# Patient Record
Sex: MALE | Race: WHITE | NOT HISPANIC OR LATINO | URBAN - METROPOLITAN AREA
[De-identification: names, ages, dates, MRNs, and addresses within clinical notes are randomized per-mention and may not be internally consistent; named-entity substitution may affect disease eponyms.]

---

## 2017-05-04 ENCOUNTER — EMERGENCY (EMERGENCY)
Facility: HOSPITAL | Age: 32
LOS: 0 days | Discharge: HOME | End: 2017-05-04
Attending: EMERGENCY MEDICINE
Payer: OTHER MISCELLANEOUS

## 2017-05-04 VITALS
TEMPERATURE: 98 F | RESPIRATION RATE: 16 BRPM | OXYGEN SATURATION: 100 % | DIASTOLIC BLOOD PRESSURE: 85 MMHG | HEART RATE: 68 BPM | SYSTOLIC BLOOD PRESSURE: 125 MMHG | HEIGHT: 77 IN | WEIGHT: 240.08 LBS

## 2017-05-04 PROCEDURE — 99284 EMERGENCY DEPT VISIT MOD MDM: CPT

## 2017-05-04 PROCEDURE — 73030 X-RAY EXAM OF SHOULDER: CPT | Mod: 26,LT

## 2017-05-04 PROCEDURE — 73610 X-RAY EXAM OF ANKLE: CPT | Mod: 26,LT

## 2017-05-04 NOTE — ED PROVIDER NOTE - MEDICAL DECISION MAKING DETAILS
Xrays demonstrate no acute pathology. pt appears well and non-toxic. . VSS. Sx have improved during ED stay. No acute events during ED observation period. Precautions given to return to the ED if sx persist or change. Pt expresses understanding and has no further questions. Pt feels comfortable and wishes to be discharged home. Instructed to follow up with PMD ortho in 24 hrs.

## 2017-05-04 NOTE — ED ADULT NURSE NOTE - OBJECTIVE STATEMENT
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

## 2017-05-04 NOTE — ED PROVIDER NOTE - CARE PLAN
Principal Discharge DX:	Sprain of left ankle, unspecified ligament, initial encounter  Secondary Diagnosis:	Sprain of other part of left shoulder region, initial encounter

## 2017-05-04 NOTE — ED PROVIDER NOTE - PHYSICAL EXAMINATION
GEN: Alert, NAD  HEAD: atraumatic, EOMI, PERRL, normal lids/conjunctiva  ENT: normal hearing, patent oropharynx without erythema/exudate, uvula midline  NECK: +supple, no tenderness/meningismus, +Trachea midline  PULM: Bilateral BS, normal resp effort, no wheeze/stridor/retractions  CV: RRR, no M/R/G, +dist ext pulses  ABD: soft, NT/ND  BACK: no CVAT, no midline tenderness  MSK: no edema/erythema/cyanosis  SKIN: no rash  NEURO: AAOx3, no sensory/motor deficits, CN 2-12 intact  L ankle - mild ttp and swelling lateral ankle,   L shoulder - nonfocal pain anterior shoulder, no gross deformity, FROM
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
## 2017-05-05 DIAGNOSIS — S93.402A SPRAIN OF UNSPECIFIED LIGAMENT OF LEFT ANKLE, INITIAL ENCOUNTER: ICD-10-CM

## 2017-05-05 DIAGNOSIS — Z88.0 ALLERGY STATUS TO PENICILLIN: ICD-10-CM

## 2017-05-05 DIAGNOSIS — M25.579 PAIN IN UNSPECIFIED ANKLE AND JOINTS OF UNSPECIFIED FOOT: ICD-10-CM
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
## 2017-05-05 DIAGNOSIS — S43.402A UNSPECIFIED SPRAIN OF LEFT SHOULDER JOINT, INITIAL ENCOUNTER: ICD-10-CM

## 2018-03-29 ENCOUNTER — TRANSCRIPTION ENCOUNTER (OUTPATIENT)
Age: 33
End: 2018-03-29

## 2018-03-30 ENCOUNTER — TRANSCRIPTION ENCOUNTER (OUTPATIENT)
Age: 33
End: 2018-03-30

## 2018-07-25 NOTE — ED PROVIDER NOTE - PRESENTING SYMPTOMS DETAILS
Chief complaint:   Chief Complaint   Patient presents with   • Knee Pain     fall DOI 07/24/18   • Ankle Pain     fall DOI 07/24/18       Vitals:  Visit Vitals  /77 (BP Location: [facility], Patient Position: Sitting, Cuff Size: Large Adult)   Pulse 60   Temp 98.1 °F (36.7 °C) (Oral)   Resp 16   SpO2 95%       HISTORY OF PRESENT ILLNESS     Patient states he was walking up the stairs, fell forwards and landed on right knee and twisted left ankle.  Denies any other injury at incident.  History of left ankle fracture with plate repair.   Chronic peripheral edema.        Fall   The accident occurred 1 to 3 hours ago. The point of impact was the right knee. The pain is present in the right knee (left ankle). The symptoms are aggravated by ambulation and pressure on injury. Pertinent negatives include no fever, loss of consciousness, numbness or tingling. He has tried nothing for the symptoms. The treatment provided no relief.       Other significant problems:  Patient Active Problem List    Diagnosis Date Noted   • Rotator cuff tendinitis 04/24/2018     Priority: Low   • Benign nodular prostatic hyperplasia without lower urinary tract symptoms 12/06/2016     Priority: Low   • Closed fracture of distal end of left fibula 04/04/2016     Priority: Low   • BPH (benign prostatic hyperplasia) 12/01/2014     Priority: Low   •  cardiac [id number] 11/06/2014     Priority: Low   • SHAREE (obstructive sleep apnea) 10/08/2014     Priority: Low     1/28/2014: Unable to tolerate variety of masks - untreated.     • Impotence of organic origin 07/23/2012     Priority: Low   • Microscopic hematuria 07/23/2012     Priority: Low   • Brachioradial pruritus 11/28/2011     Priority: Low   • Hyperlipidemia      Priority: Low   • CAD (coronary artery disease)      Priority: Low     8/31/2000 CABG / Dr Burch @ [facility]: LIMA side-to-side to D1, end-to-side to LAD coronary artery and saphenous veins from aorta to the RCA and from aorta to  M3.  2/26/2003 [facility] / Dr Rothman @ [facility] for recurrent CP/abn stress test: Patent grafts. Small high marginal which has not been bypassed that is < a 2 mm vessel with an 80% ostial lesion. LV function normal. Continue medical therapy; if classic angina w/evidence of ischemia in the high lateral wall, consider angioplasty of this relatively sm marginal branch.   9/15/2005 [facility] / Dr Rothman @ [facility] for CP/abn stress tests: Grafts patent. Severe disease in a tiny high marginal which was not approached. Medical therapy recommended. LV function normal  11/14/2011 [facility] / Dr Ibrahim @ [facility]: RT dominant coronary anatomy generalized CAD: long segment of 70-80% mid RCA stenosis. Diffuse 70-80% LT main stenosis. Ostial stenosis 80-90% CX trunk. Chronic total occ of mid LAD coronary artery. Graphs patent. EF 60%. Mild hypokinesis mid anterior wall. Cont medical management.   11/06/14  [facility]   [place]  Dr. Concepcion   Severe CAD /  RCA mid 90% stenosis /  LAD >LIMA  50% stenosis in LAD /  LAD prox TO /  Left Cflx severe /  AO>OM1 graft wide open /  AO>PDA and OM2 graft wide open /  EF 60%     • GERD (gastroesophageal reflux disease)      Priority: Low   • Obesity      Priority: Low   • Pompholyx 08/31/2011     Priority: Low   • Keratolysis exfoliativa 08/31/2011     Priority: Low   • Essential hypertension, benign 06/10/2011     Priority: Low       PAST MEDICAL, FAMILY AND SOCIAL HISTORY     Medications:  Current Outpatient Prescriptions   Medication   • tolterodine (DETROL LA) 4 MG 24 hr capsule   • potassium chloride (KLOR-CON M) 20 MEQ shruthi ER tablet   • traZODone (DESYREL) 50 MG tablet   • esomeprazole (NEXIUM) 40 MG capsule   • Melatonin 5 MG Chew Tab   • acetaminophen (TYLENOL) 500 MG tablet   • gabapentin (NEURONTIN) 300 MG capsule   • metformin (GLUCOPHAGE-XR) 500 MG 24 hr tablet   • metoPROLOL succinate (TOPROL-XL) 50 MG 24 hr tablet   • clotrimazole (LOTRIMIN) 1 % cream   • carbidopa-levodopa (SINEMET CR)  MG per CR tablet    • simvastatin (ZOCOR) 80 MG tablet   • diclofenac (VOLTAREN) 1 % gel   • furosemide (LASIX) 40 MG tablet   • aspirin 81 MG tablet   • Polyethyl Glycol-Propyl Glycol (SYSTANE FREE OP)   • Fluocinonide 0.1 % CREA   • budesonide (ENTOCORT EC) 3 MG 24 hr capsule   • HYDROcodone-acetaminophen (NORCO) 5-325 MG per tablet   • nitroGLYcerin (NITROSTAT) 0.4 MG SL tablet     No current facility-administered medications for this visit.        Allergies:  ALLERGIES:   Allergen Reactions   • Klaron [Sulfacetamide Sodium] RASH     Burning sensation on face with this rosacea cm   • Adhesive ERYTHEMA     EKG patches    • Ambien Other (See Comments)     CONFUSION       Past Medical  History/Surgeries:  Past Medical History:   Diagnosis Date   • Anxiety    • Arthritis    • Bullard's esophagus    • Blood clot associated with vein wall inflammation 2006    behind left eye retina;    • BPH (benign prostatic hyperplasia)    • CAD (coronary artery disease)     CABG 8/31/2000   • Cataract    • Diabetes mellitus (CMS/HCC)    • Exogenous obesity     chronic   • Fracture     left collarbone   • Fracture     left ankle   • GERD (gastroesophageal reflux disease)    • HTN (hypertension)     patient denies   • Hx of CABG 2010   • Hypercholesterolemia    • Parkinson disease (CMS/HCC)     follows at [facility]   • Sleep apnea     does not tolerate c pap       Past Surgical History:   Procedure Laterality Date   • Cardiac surgery     • Coronary artery bypass graft  08/31/2000    Dr Burch @ [facility]: LIMA side-to-side to D1, end-to-side to LAD coronary artery and saphenous veins from aorta to the RCA and from aorta to M3.   • Cystourethroscopy  8/13/12    Dr. Toth-microscopic hematuria   • Cystourethroscopy  9/30/14    Dr. Toth   • Cystourethroscopy  7/27/2015    Dr. Toth   • Cystourethroscopy w inj for chemodenervation of the bladder  10/26/2016    100IU botox   • Eye surgery      lasik   • Gastric fundoplication  2001   • Halo application  2010     FOR [facility]   • Hernia repair  2010    UMBILICAL   • Laser of prostate w/ green light pvp  12/3/14    Dr. Toth   • Left heart cath,percutaneous  11/14/2011    Dr Ibrahim @ [facility]: RT dominant coronary anatomy generalized CAD: long segment of 70-80% mid RCA stenosis. Diffuse 70-80% LT main stenosis. Ostial stenosis 80-90% CX trunk. Chronic total occ of mid LAD coronary artery. Graphs patent. EF 60%. Mild hypokinesis mid anterior wall. Cont medical management.   • Left heart cath,percutaneous  2/26/2003    Dr Rothman @ [facility] for recurrent CP/abn stress test: Patent grafts. Small high marginal which has not been bypassed that is < a 2 mm vessel with an 80% ostial lesion. LV function normal. Continue medical therapy; if classic angina w/evidence of ischemia in the high lateral wall, consider angioplasty of this relatively sm marginal branch.   • Left heart cath,percutaneous  9/15/2005    Dr Rothman @ [facility] for CP/abn stress tests: Grafts patent. Severe disease in a tiny high marginal which was not approached. Medical therapy recommended. LV function normal.   • Left heart cath,percutaneous  11/06/2014    [facility]/ Dr. White/ Severe CAD/  RCA mid 90% stenosis/  LAD distal>LIMA  50% stenosis/  LAD prox TO/  Left Cflx severe/  Both OM's occluded/  AO>OM1 graft wide open/  AO>PDA and OM2 graft wide open/  EF 60%   • Orif tibia & fibula fractures  04/14/2016    Dr. Faith   • Ptca         Family History:  Family History   Problem Relation Age of Onset   • Cancer Sister    • Other Sister         PSEUDOCHOLINESTERASE   • High cholesterol Sister    • Cancer Brother         bladder   • Diabetes Brother    • Heart disease Brother    • High cholesterol Brother    • Aneurysm Mother    • Arthritis Mother    • Liver Disease Father         DUE TO ETOH       Social History:  Social History   Substance Use Topics   • Smoking status: Former Smoker     Packs/day: 1.00     Years: 2.00     Types: Cigarettes     Quit  date: 1/1/1970   • Smokeless tobacco: Never Used   • Alcohol use 0.0 oz/week      Comment: rarely       REVIEW OF SYSTEMS     Review of Systems   Constitutional: Negative for fever.   Neurological: Negative for tingling, loss of consciousness and numbness.       PHYSICAL EXAM     Physical Exam   Constitutional: He is oriented to person, place, and time. He appears well-developed and well-nourished. No distress.   Musculoskeletal:        Right knee: He exhibits normal range of motion, no swelling, no effusion, no ecchymosis and no erythema. Lacerations: abrasion on knee. Tenderness found. Medial joint line tenderness noted. No lateral joint line tenderness noted.        Right ankle: He exhibits normal range of motion, no swelling, no ecchymosis, no deformity and normal pulse. No tenderness. No lateral malleolus, no medial malleolus and no head of 5th metatarsal tenderness found. Achilles tendon normal.        Left ankle: He exhibits normal range of motion, no swelling, no ecchymosis, no deformity, no laceration and normal pulse. Tenderness. Lateral malleolus tenderness found. No medial malleolus tenderness found. Achilles tendon normal.        Right lower leg: He exhibits edema. He exhibits no tenderness and no bony tenderness.        Left lower leg: He exhibits edema. He exhibits no tenderness and no bony tenderness.        Legs:       Right foot: There is normal range of motion, no tenderness, no bony tenderness, no swelling and normal capillary refill.        Left foot: There is normal range of motion, no tenderness and no bony tenderness.   Neurological: He is alert and oriented to person, place, and time.   Skin: Skin is warm and dry. He is not diaphoretic.   Psychiatric: He has a normal mood and affect. His behavior is normal. Judgment and thought content normal.   Nursing note and vitals reviewed.    EXAM:  Three-view right knee series, three-view left ankle series     DATE:  7/24/2018     CLINICAL INDICATION:   Fall with complaints of right knee and left ankle  pain     COMPARISON:  Three-view left ankle series 1/14/2018     FINDINGS:  Surgical clips noted medially within the proximal calf. No acute  fracture or dislocation involves the right knee. No significant  suprapatellar joint effusion is present. Joint spaces appear  well-maintained.      Orthopedic plate and transfixing screws again demonstrated within the  distal left fibula. No acute fracture or dislocation involves the left  ankle. The mortise is maintained.        IMPRESSION:    1.  No acute fracture or dislocation involving the right knee or left ankle     ASSESSMENT/PLAN     Dudley was seen today for knee pain and ankle pain.    Diagnoses and all orders for this visit:    Fall in home, initial encounter  -     XR KNEE 3 VW RIGHT; Future  -     XR ANKLE 3+ VW LEFT; Future    Acute pain of right knee  -     XR KNEE 3 VW RIGHT; Future    Acute left ankle pain  -     XR ANKLE 3+ VW LEFT; Future      Abrasion cleaned and dressed by nursing staff.   Ace wrap to right knee and left ankle.   Care of area reviewed.  Normal course of injury reviewed. Patient to elevate legs and follow up with PCP or partner due to peripheral edema.   Comfort care measures, over the counter treatments, when to follow up and seek emergency care; all reviewed with patient.   After visit summary reviewed with patient.  Tammy L Schladweiler, NP  Collaborating Physician: Toshia Grande MD.     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 no falls. no knee pain, no headache/backpain. no focal weakness/numbness  ROS: No fever/chills, No photophobia/eye pain/changes in vision, No ear pain/sore throat/dysphagia, No chest pain/palpitations, no SOB/cough/wheeze/stridor, No abdominal pain/N/V/D, no dysuria/frequency/discharge, No neck/back pain, no rash, no changes in neurological status/function.

## 2019-01-16 ENCOUNTER — TRANSCRIPTION ENCOUNTER (OUTPATIENT)
Age: 34
End: 2019-01-16

## 2019-07-15 ENCOUNTER — EMERGENCY (EMERGENCY)
Facility: HOSPITAL | Age: 34
LOS: 0 days | Discharge: HOME | End: 2019-07-15
Payer: OTHER MISCELLANEOUS

## 2019-07-15 VITALS
HEART RATE: 67 BPM | RESPIRATION RATE: 20 BRPM | OXYGEN SATURATION: 100 % | TEMPERATURE: 98 F | HEIGHT: 77 IN | WEIGHT: 270.07 LBS | DIASTOLIC BLOOD PRESSURE: 70 MMHG | SYSTOLIC BLOOD PRESSURE: 131 MMHG

## 2019-07-15 VITALS — DIASTOLIC BLOOD PRESSURE: 78 MMHG | TEMPERATURE: 98 F | SYSTOLIC BLOOD PRESSURE: 132 MMHG | HEART RATE: 76 BPM

## 2019-07-15 DIAGNOSIS — M54.5 LOW BACK PAIN: ICD-10-CM
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
## 2019-07-15 PROCEDURE — 99283 EMERGENCY DEPT VISIT LOW MDM: CPT

## 2019-07-15 RX ORDER — CYCLOBENZAPRINE HYDROCHLORIDE 10 MG/1
1 TABLET, FILM COATED ORAL
Qty: 12 | Refills: 0
Start: 2019-07-15

## 2019-07-15 RX ORDER — IBUPROFEN 200 MG
600 TABLET ORAL ONCE
Refills: 0 | Status: COMPLETED | OUTPATIENT
Start: 2019-07-15 | End: 2019-07-15

## 2019-07-15 RX ORDER — IBUPROFEN 200 MG
1 TABLET ORAL
Qty: 20 | Refills: 0
Start: 2019-07-15

## 2019-07-15 RX ADMIN — Medication 600 MILLIGRAM(S): at 15:28

## 2019-07-15 NOTE — ED PROVIDER NOTE - OBJECTIVE STATEMENT
35 y/o male, [employer], with no PMH here c/o lower back pain s/p injury x 1 hour ago. 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 pain worse with movement. pt hasn't taken anything for pain. no bowel or bladder incontinence. no ivda. no fevers. no change in sensation or change in gait. pt otherwise has no other complaints    ROS: No fever/chills. No eye pain/changes in vision, No ear pain/sore throat/dysphagia, No chest pain/palpitations. No SOB/cough/. No abdominal pain, N/V/D, no black/bloody bm. No dysuria/frequency/discharge, No headache. No Dizziness.    No rashes or breaks in skin. No numbness/tingling/weakness.

## 2019-07-15 NOTE — ED PROVIDER NOTE - CLINICAL SUMMARY MEDICAL DECISION MAKING FREE TEXT BOX
likely muscle strain, no red flags, neuro exam unremarkable, pt is ambulatory in ed without complications, vss, afebrile, pt states feels better after meds pt not driving home, pt will fu with pcp, return precautions given, ok with dc

## 2019-07-15 NOTE — ED PROVIDER NOTE - PHYSICAL EXAMINATION
Gen: Alert, NAD, well appearing, not toxic  Head: NC, AT, PERRL, EOMI, normal lids/conjunctiva  ENT: B TM WNL, normal hearing, patent oropharynx without erythema/exudate, uvula midline  Neck: +supple, no tenderness/meningismus/JVD, +Trachea midline  Pulm: Bilateral BS, normal resp effort, no wheeze/stridor/retractions  CV: RRR, no M/R/G, +dist pulses  Abd: soft, NT/ND, +BS, no hepatosplenomegaly  Mskel: no edema/erythema/cyanosis no saddle anesthesia, +R lumbar paraspinal tenderness, no ecchymosis, no step off, no spinal tenderness ctls, neg slr, str 5/5 x4 bl, sensations intact, gait ok  Skin: no rash  Neuro: AAOx3, no sensory/motor deficits, CN 2-12 intact

## 2019-09-19 ENCOUNTER — EMERGENCY (EMERGENCY)
Facility: HOSPITAL | Age: 34
LOS: 1 days | Discharge: HOME | End: 2019-09-19
Attending: EMERGENCY MEDICINE | Admitting: EMERGENCY MEDICINE
Payer: COMMERCIAL

## 2019-09-19 VITALS
HEART RATE: 64 BPM | TEMPERATURE: 98 F | DIASTOLIC BLOOD PRESSURE: 76 MMHG | OXYGEN SATURATION: 98 % | RESPIRATION RATE: 16 BRPM | SYSTOLIC BLOOD PRESSURE: 138 MMHG

## 2019-09-19 VITALS — HEIGHT: 77 IN | WEIGHT: 279.99 LBS

## 2019-09-19 PROCEDURE — 73030 X-RAY EXAM OF SHOULDER: CPT

## 2019-09-19 PROCEDURE — 99283 EMERGENCY DEPT VISIT LOW MDM: CPT

## 2019-09-19 PROCEDURE — 73030 X-RAY EXAM OF SHOULDER: CPT | Mod: 26,RT

## 2019-09-19 RX ORDER — IBUPROFEN 200 MG
600 TABLET ORAL ONCE
Refills: 0 | Status: COMPLETED | OUTPATIENT
Start: 2019-09-19 | End: 2019-09-19

## 2019-09-19 RX ADMIN — Medication 600 MILLIGRAM(S): at 22:38
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
## 2019-09-19 NOTE — ED PROVIDER NOTE - NS ED ROS FT
General: Denies fevers or chills  Eyes: Denies vision changes  ENMT: Denies trouble swallowing or speaking, or sore throat  CV: Denies chest pain  Resp: Denies cough or SOB  GI: Denies abdominal pain, nausea, vomiting, diarrhea, or constipation   : Denies painful urination  Skin: Denies new rashes  Neuro: Denies headache, numbness, or weakness  MSK: R shoulder pain, R lower back pain

## 2019-09-19 NOTE — ED PROVIDER NOTE - PHYSICAL EXAMINATION
I have reviewed the triage vital signs.  Const: AAOx3, laying comfortably in NAD  Eyes: PERRL, no conjunctival injection  HENT: NCAT, Neck supple without meningismus, oral mucosa moist  CV: RRR, no murmurs, rubs, or gallops  Resp: CTAB, no respiratory distress, no wheezes, rales, or rhonchi  GI: Abdomen soft, NTND, no guarding, no CVA tenderness  Extremities: No peripheral edema,  2+ radial and DP pulses  Skin: Warm, well perfused, no rash. No ecchymosis or erythema  MSK: No gross deformities appreciated. Mild TTP R anterior shoulder and R lumbar paraspinals. No midline tenderness   Neuro: CN2-12 grossly intact, MS +5/5 in UE and LE BL, gross sensation intact in UE and LE BL, gait smooth and coordinated  Psych: Appropriate mood and affect
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
## 2019-09-19 NOTE — ED PROVIDER NOTE - NSFOLLOWUPINSTRUCTIONS_ED_ALL_ED_FT
You were seen in the ER for shoulder pain and back pain.    1. Follow up with your primary care doctor within 48 hours.    2. You may take Ibuprofen (Motrin, Advil) or Acetaminophen (Tylenol) as needed, as directed on packaging for pain or fever.    3. Return to the ER for any new or worsening symptoms or concerns, such as fever, chills, worsening pain, numbness, tingling, etc.

## 2019-09-19 NOTE — ED PROVIDER NOTE - PATIENT PORTAL LINK FT
You can access the FollowMyHealth Patient Portal offered by [facility] by registering at the following website: [email or web address]. By joining [employer]’s FollowMyHealth portal, you will also be able to view your health information using other applications (apps) compatible with our system.
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
## 2019-10-13 ENCOUNTER — TRANSCRIPTION ENCOUNTER (OUTPATIENT)
Age: 34
End: 2019-10-13

## 2019-10-31 ENCOUNTER — TRANSCRIPTION ENCOUNTER (OUTPATIENT)
Age: 34
End: 2019-10-31

## 2019-12-26 ENCOUNTER — RX RENEWAL (OUTPATIENT)
Age: 34
End: 2019-12-26

## 2019-12-26 ENCOUNTER — APPOINTMENT (OUTPATIENT)
Dept: INTERNAL MEDICINE | Facility: CLINIC | Age: 34
End: 2019-12-26
Payer: COMMERCIAL

## 2019-12-26 VITALS — DIASTOLIC BLOOD PRESSURE: 80 MMHG | SYSTOLIC BLOOD PRESSURE: 120 MMHG

## 2019-12-26 VITALS
HEART RATE: 92 BPM | OXYGEN SATURATION: 98 % | HEIGHT: 76 IN | BODY MASS INDEX: 34.1 KG/M2 | WEIGHT: 280 LBS | TEMPERATURE: 98 F

## 2019-12-26 DIAGNOSIS — F98.8 OTHER SPECIFIED BEHAVIORAL AND EMOTIONAL DISORDERS WITH ONSET USUALLY OCCURRING IN CHILDHOOD AND ADOLESCENCE: ICD-10-CM

## 2019-12-26 DIAGNOSIS — Z78.9 OTHER SPECIFIED HEALTH STATUS: ICD-10-CM

## 2019-12-26 PROCEDURE — 82272 OCCULT BLD FECES 1-3 TESTS: CPT

## 2019-12-26 PROCEDURE — 36415 COLL VENOUS BLD VENIPUNCTURE: CPT

## 2019-12-26 PROCEDURE — 99385 PREV VISIT NEW AGE 18-39: CPT | Mod: 25

## 2019-12-26 NOTE — HISTORY OF PRESENT ILLNESS
[de-identified] : 34 M good health for CPE. ROS all neg exc for tension type H/A daytime w/o neuro signs or sympt. On no meds but would like to try adderral for ADD.Not sexually active. not smoke. Works [employer]

## 2019-12-26 NOTE — ASSESSMENT
[FreeTextEntry1] : pt would like tx for ADD> To see bloods. H/A likely 2nd to tension.Overall health good . To get immunization record

## 2019-12-26 NOTE — PHYSICAL EXAM
[Well Developed] : well developed [Well Nourished] : well nourished [No Acute Distress] : no acute distress [Well-Appearing] : well-appearing [PERRL] : pupils equal round and reactive to light [Normal Sclera/Conjunctiva] : normal sclera/conjunctiva [EOMI] : extraocular movements intact [Normal Outer Ear/Nose] : the outer ears and nose were normal in appearance [Normal Oropharynx] : the oropharynx was normal [Supple] : supple [No Lymphadenopathy] : no lymphadenopathy [No JVD] : no jugular venous distention [Thyroid Normal, No Nodules] : the thyroid was normal and there were no nodules present [No Accessory Muscle Use] : no accessory muscle use [No Respiratory Distress] : no respiratory distress  [Clear to Auscultation] : lungs were clear to auscultation bilaterally [Normal Rate] : normal rate  [Regular Rhythm] : with a regular rhythm [No Carotid Bruits] : no carotid bruits [Normal S1, S2] : normal S1 and S2 [No Murmur] : no murmur heard [No Varicosities] : no varicosities [No Abdominal Bruit] : a ~M bruit was not heard ~T in the abdomen [No Palpable Aorta] : no palpable aorta [Pedal Pulses Present] : the pedal pulses are present [No Edema] : there was no peripheral edema [Soft] : abdomen soft [No Extremity Clubbing/Cyanosis] : no extremity clubbing/cyanosis [Non Tender] : non-tender [Non-distended] : non-distended [No Masses] : no abdominal mass palpated [No HSM] : no HSM [Normal Bowel Sounds] : normal bowel sounds [Stool Occult Blood] : stool negative for occult blood [Normal Posterior Cervical Nodes] : no posterior cervical lymphadenopathy [Normal Anterior Cervical Nodes] : no anterior cervical lymphadenopathy [No Spinal Tenderness] : no spinal tenderness [No CVA Tenderness] : no CVA  tenderness [No Joint Swelling] : no joint swelling [Grossly Normal Strength/Tone] : grossly normal strength/tone [No Rash] : no rash [Coordination Grossly Intact] : coordination grossly intact [No Focal Deficits] : no focal deficits [Normal Gait] : normal gait [Deep Tendon Reflexes (DTR)] : deep tendon reflexes were 2+ and symmetric [Normal Insight/Judgement] : insight and judgment were intact [Normal Affect] : the affect was normal [de-identified] : large, well muscled [Normal] : affect was normal and insight and judgment were intact [de-identified] : tottoos [FreeTextEntry1] : prostate neg

## 2019-12-27 LAB
ALBUMIN SERPL ELPH-MCNC: 4.9 G/DL
ALP BLD-CCNC: 72 U/L
ALT SERPL-CCNC: 19 U/L
ANION GAP SERPL CALC-SCNC: 11 MMOL/L
APPEARANCE: CLEAR
AST SERPL-CCNC: 18 U/L
BACTERIA: NEGATIVE
BASOPHILS # BLD AUTO: 0.05 K/UL
BASOPHILS NFR BLD AUTO: 0.8 %
BILIRUB SERPL-MCNC: 0.2 MG/DL
BILIRUBIN URINE: NEGATIVE
BLOOD URINE: NEGATIVE
BUN SERPL-MCNC: 20 MG/DL
CALCIUM SERPL-MCNC: 10.1 MG/DL
CHLORIDE SERPL-SCNC: 104 MMOL/L
CHOLEST SERPL-MCNC: 206 MG/DL
CHOLEST/HDLC SERPL: 7.1 RATIO
CO2 SERPL-SCNC: 27 MMOL/L
COLOR: NORMAL
CREAT SERPL-MCNC: 1.16 MG/DL
EOSINOPHIL # BLD AUTO: 0.05 K/UL
EOSINOPHIL NFR BLD AUTO: 0.8 %
GLUCOSE QUALITATIVE U: NEGATIVE
GLUCOSE SERPL-MCNC: 112 MG/DL
HCT VFR BLD CALC: 41.9 %
HDLC SERPL-MCNC: 29 MG/DL
HGB BLD-MCNC: 14 G/DL
HYALINE CASTS: 0 /LPF
IMM GRANULOCYTES NFR BLD AUTO: 0.2 %
KETONES URINE: NEGATIVE
LDLC SERPL CALC-MCNC: 110 MG/DL
LEUKOCYTE ESTERASE URINE: NEGATIVE
LYMPHOCYTES # BLD AUTO: 1.53 K/UL
LYMPHOCYTES NFR BLD AUTO: 24.4 %
MAN DIFF?: NORMAL
MCHC RBC-ENTMCNC: 31.4 PG
MCHC RBC-ENTMCNC: 33.4 GM/DL
MCV RBC AUTO: 93.9 FL
MICROSCOPIC-UA: NORMAL
MONOCYTES # BLD AUTO: 0.34 K/UL
MONOCYTES NFR BLD AUTO: 5.4 %
NEUTROPHILS # BLD AUTO: 4.3 K/UL
NEUTROPHILS NFR BLD AUTO: 68.4 %
NITRITE URINE: NEGATIVE
PH URINE: 6.5
PLATELET # BLD AUTO: 270 K/UL
POTASSIUM SERPL-SCNC: 4.7 MMOL/L
PROT SERPL-MCNC: 7.6 G/DL
PROTEIN URINE: NEGATIVE
RBC # BLD: 4.46 M/UL
RBC # FLD: 11.7 %
RED BLOOD CELLS URINE: 0 /HPF
SODIUM SERPL-SCNC: 142 MMOL/L
SPECIFIC GRAVITY URINE: 1.03
SQUAMOUS EPITHELIAL CELLS: 0 /HPF
T4 SERPL-MCNC: 5.4 UG/DL
TRIGL SERPL-MCNC: 337 MG/DL
TSH SERPL-ACNC: 1.51 UIU/ML
UROBILINOGEN URINE: NORMAL
WBC # FLD AUTO: 6.28 K/UL
WHITE BLOOD CELLS URINE: 0 /HPF

## 2020-01-02 ENCOUNTER — RX RENEWAL (OUTPATIENT)
Age: 35
End: 2020-01-02

## 2020-02-26 PROBLEM — Z78.9 OTHER SPECIFIED HEALTH STATUS: Chronic | Status: ACTIVE | Noted: 2019-09-19

## 2020-04-26 ENCOUNTER — MESSAGE (OUTPATIENT)
Age: 35
End: 2020-04-26

## 2020-05-02 LAB
SARS-COV-2 IGG SERPL IA-ACNC: <0.1 INDEX
SARS-COV-2 IGG SERPL QL IA: NEGATIVE

## 2020-07-20 ENCOUNTER — EMERGENCY (EMERGENCY)
Facility: HOSPITAL | Age: 35
LOS: 1 days | Discharge: HOME | End: 2020-07-20
Attending: EMERGENCY MEDICINE | Admitting: EMERGENCY MEDICINE
Payer: OTHER MISCELLANEOUS

## 2020-07-20 VITALS
OXYGEN SATURATION: 100 % | TEMPERATURE: 98 F | HEART RATE: 99 BPM | DIASTOLIC BLOOD PRESSURE: 79 MMHG | RESPIRATION RATE: 18 BRPM | SYSTOLIC BLOOD PRESSURE: 137 MMHG

## 2020-07-20 PROCEDURE — 73564 X-RAY EXAM KNEE 4 OR MORE: CPT | Mod: 26,LT

## 2020-07-20 PROCEDURE — 99283 EMERGENCY DEPT VISIT LOW MDM: CPT

## 2020-07-20 RX ORDER — IBUPROFEN 200 MG
400 TABLET ORAL ONCE
Refills: 0 | Status: COMPLETED | OUTPATIENT
Start: 2020-07-20 | End: 2020-07-20

## 2020-07-20 RX ADMIN — Medication 400 MILLIGRAM(S): at 20:12

## 2020-07-20 NOTE — ED PROVIDER NOTE - CLINICAL SUMMARY MEDICAL DECISION MAKING FREE TEXT BOX
36 yo M no sig pmh presents with left knee pain after twisting injury.  Exam with non-swollen knee without laxity.  Possible MCL sprain.  Will obtain x-ray, analgesia.  Anticipate discharge.

## 2020-07-20 NOTE — ED PROVIDER NOTE - NSFOLLOWUPINSTRUCTIONS_ED_ALL_ED_FT
1.  Please rest, ice, and elevate knee after discharge.   2.  Take ibuprofen 400 mg every 6-8 hours as needed with food.   3.  Return to care for worsening pain, swelling, fever, numbness, tingling, inability to walk.   4.  Follow with your primary care doctor as early as able.

## 2020-07-20 NOTE — ED PROVIDER NOTE - PHYSICAL EXAMINATION
GEN:  Non-toxic appearing, non-distressed, speaking full sentences, non-diaphoretic, AAOx3  HEENT:  NCAT, neck supple, EOMI, PERRLA, sclera anicteric, no conjunctival pallor or injection, no stridor, normal voice, no tonsillar exudate, uvula midline, tympanic membranes and external auditory canals normal appearing bilaterally   CV:  regular rhythm and rate, s1/s2 audible, no murmurs, rubs or gallops, peripheral pulses 2+ and symmetric  PULM:  non-labored respirations, lungs clear to auscultation bilaterally, no wheezes, crackles or rales  ABD:  non distended, non-tender, no rebound, no guarding, negative Holguin's sign, bowel sounds normal, no cvat  MSK:  left knee without swelling or deformity, minimally tender at medial joint line, stable to a/p/v/v stress   NEURO:  AAOx3, CN II-XII intact, motor 5/5 in upper and lower extremities bilaterally, sensation grossly intact in extremities and trunk, finger to nose testing wnl, no nystagmus, negative Romberg, no pronator drift, no gait deficit  SKIN:  warm, dry, no rash or vesicles

## 2020-07-20 NOTE — ED PROVIDER NOTE - PATIENT PORTAL LINK FT
You can access the FollowMyHealth Patient Portal offered by [facility] by registering at the following website: [email or web address]. By joining [employer]’s FollowMyHealth portal, you will also be able to view your health information using other applications (apps) compatible with our system.

## 2020-07-20 NOTE — ED PROVIDER NOTE - OBJECTIVE STATEMENT
34 yo M no sig pmh presents with left knee pain.  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  Initially did not feel pain, but then noticed left knee pain with ambulation 1-2 hours after injury.  Denies numbness, weakness, tingling.  Still able to ambulate.  Denies other injuries.

## 2020-07-20 NOTE — ED PROVIDER NOTE - PROGRESS NOTE DETAILS
CHAPMAN:  X-ray negative for fracture.  Patient able to ambulate without assistance.  Will discharge.

## 2020-10-16 ENCOUNTER — EMERGENCY (EMERGENCY)
Facility: HOSPITAL | Age: 35
LOS: 1 days | Discharge: HOME | End: 2020-10-16
Attending: EMERGENCY MEDICINE
Payer: COMMERCIAL

## 2020-10-16 VITALS
OXYGEN SATURATION: 99 % | RESPIRATION RATE: 16 BRPM | HEART RATE: 3 BPM | DIASTOLIC BLOOD PRESSURE: 2 MMHG | HEIGHT: 77 IN | WEIGHT: 265 LBS | SYSTOLIC BLOOD PRESSURE: 142 MMHG

## 2020-10-16 VITALS
OXYGEN SATURATION: 99 % | HEART RATE: 79 BPM | SYSTOLIC BLOOD PRESSURE: 117 MMHG | RESPIRATION RATE: 16 BRPM | TEMPERATURE: 99 F | DIASTOLIC BLOOD PRESSURE: 79 MMHG

## 2020-10-16 PROCEDURE — 99283 EMERGENCY DEPT VISIT LOW MDM: CPT

## 2020-10-16 PROCEDURE — 73120 X-RAY EXAM OF HAND: CPT

## 2020-10-16 PROCEDURE — 73120 X-RAY EXAM OF HAND: CPT | Mod: 26,LT

## 2020-10-16 RX ORDER — IBUPROFEN 200 MG
600 TABLET ORAL ONCE
Refills: 0 | Status: COMPLETED | OUTPATIENT
Start: 2020-10-16 | End: 2020-10-16

## 2020-10-16 RX ADMIN — Medication 600 MILLIGRAM(S): at 20:28

## 2020-10-16 NOTE — ED PROVIDER NOTE - CLINICAL SUMMARY MEDICAL DECISION MAKING FREE TEXT BOX
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

## 2020-10-16 NOTE — ED ADULT NURSE NOTE - OBJECTIVE STATEMENT
34yo M, walked in to ED c/o L ring finger pain. 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 Pt is resting in bed, denies Chest pain, SOB, N/V/D, fever. 36yo M, walked in to ED c/o L ring finger pain. 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 Pt states that finger only hurts when he tries to move it 5/10. Upon assessment L ring finger does not appear to have swelling, redness, ecchymosis, lacs, abrasions, limited ROM noted. Pt is resting in bed, denies Chest pain, SOB, N/V/D, fever.

## 2020-10-16 NOTE — ED PROVIDER NOTE - OBJECTIVE STATEMENT
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

## 2020-10-16 NOTE — ED PROVIDER NOTE - NSFOLLOWUPINSTRUCTIONS_ED_ALL_ED_FT
1) Follow-up with your primary care provider in 1-2 days.      Follow-up with plastic/hand surgery in 1 week.  2) Continue to take all medications as prescribed.  3) Rest and drink plenty of fluids. Pain can be managed with Acetaminophen (aka Tylenol) and Ibuprofen (aka Motrin or Advil) over the counter as directed.  4) Return to the ER for any new or worsening symptoms.

## 2020-10-16 NOTE — ED ADULT NURSE NOTE - NSIMPLEMENTINTERV_GEN_ALL_ED
Implemented All Universal Safety Interventions:  [place] to call system. Call bell, personal items and telephone within reach. Instruct patient to call for assistance. Room bathroom lighting operational. Non-slip footwear when patient is off stretcher. Physically safe environment: no spills, clutter or unnecessary equipment. Stretcher in lowest position, wheels locked, appropriate side rails in place.

## 2020-10-16 NOTE — ED PROVIDER NOTE - CARE PROVIDER_API CALL
Finesse Mohan)  Plastic Surgery; Surgery  [address], [address]  [place] [address]  Phone: [phone]  Fax: [phone]  Follow Up Time:

## 2020-10-16 NOTE — ED PROVIDER NOTE - PATIENT PORTAL LINK FT
You can access the FollowMyHealth Patient Portal offered by [facility] by registering at the following website: [email or web address]. By joining [employer]’s FollowMyHealth portal, you will also be able to view your health information using other applications (apps) compatible with our system.

## 2020-11-30 ENCOUNTER — TRANSCRIPTION ENCOUNTER (OUTPATIENT)
Age: 35
End: 2020-11-30

## 2021-02-26 ENCOUNTER — TRANSCRIPTION ENCOUNTER (OUTPATIENT)
Age: 36
End: 2021-02-26

## 2021-03-18 ENCOUNTER — APPOINTMENT (OUTPATIENT)
Dept: INTERNAL MEDICINE | Facility: CLINIC | Age: 36
End: 2021-03-18
Payer: COMMERCIAL

## 2021-03-18 ENCOUNTER — NON-APPOINTMENT (OUTPATIENT)
Age: 36
End: 2021-03-18

## 2021-03-18 VITALS
WEIGHT: 272 LBS | OXYGEN SATURATION: 98 % | HEIGHT: 76 IN | BODY MASS INDEX: 33.12 KG/M2 | HEART RATE: 83 BPM | TEMPERATURE: 98.1 F

## 2021-03-18 DIAGNOSIS — R51.9 HEADACHE, UNSPECIFIED: ICD-10-CM

## 2021-03-18 DIAGNOSIS — Z23 ENCOUNTER FOR IMMUNIZATION: ICD-10-CM

## 2021-03-18 PROBLEM — Z00.00 ENCOUNTER FOR PREVENTIVE HEALTH EXAMINATION: Status: ACTIVE | Noted: 2021-03-18

## 2021-03-18 PROCEDURE — 93000 ELECTROCARDIOGRAM COMPLETE: CPT

## 2021-03-18 PROCEDURE — 36415 COLL VENOUS BLD VENIPUNCTURE: CPT

## 2021-03-18 PROCEDURE — 99395 PREV VISIT EST AGE 18-39: CPT | Mod: 25

## 2021-03-18 PROCEDURE — 99072 ADDL SUPL MATRL&STAF TM PHE: CPT

## 2021-03-18 RX ORDER — DEXTROAMPHETAMINE SACCHARATE, AMPHETAMINE ASPARTATE, DEXTROAMPHETAMINE SULFATE AND AMPHETAMINE SULFATE 2.5; 2.5; 2.5; 2.5 MG/1; MG/1; MG/1; MG/1
10 TABLET ORAL DAILY
Qty: 30 | Refills: 0 | Status: DISCONTINUED | COMMUNITY
Start: 2020-01-02 | End: 2021-03-18

## 2021-03-18 NOTE — HISTORY OF PRESENT ILLNESS
[de-identified] : The patient is a 36-year-old single male who comes in for complete physical examination.  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  Since she is change in schedule has occurred in January he has experienced headaches which are dull and aching in the frontal sinus region and as well as the left posterior neck.  They do not radiate and is somewhat relieved by Tylenol and relieved by rest and sleep.  They are not associated with nausea vomiting or eye symptoms and he denies focalizing neurologic signs or symptoms.  Prior to this time he never had headaches that would like this.  With this exception he is feeling well and denies chest pain palpitations swelling fainting or dyspnea.  He has no GI symptoms and his weight is stable.  He denies change in his bowel blood in his bowel or melena he has no  symptoms and no history of STDs.  He is not smoking or using drugs and has had a flu shot.  Other than the headaches he is generally feeling well

## 2021-03-18 NOTE — ASSESSMENT
[FreeTextEntry1] : All screening tests done including blood cardiogram and EKG which is normal.  He has headaches which appear to be contraction type but I will have him see neurology as they are interfering with his work.  They are likely related to his work schedule and change in sleeping habits.  I have filled out forms for his work detailing his occasional disability from the headaches

## 2021-03-18 NOTE — PHYSICAL EXAM
[Normal Male:] : meatus normal, the bladder was normal on palpation, the scrotum was normal, there were no testicular masses and no prostate nodules. [Normal] : normal gait, coordination grossly intact, no focal deficits [de-identified] : And I will seen in the discs are sharp but there are no changes in his vessels [de-identified] : Large tonsils but no dysphagia [de-identified] : Of motion of neck normal without causing headaches [de-identified] : No evidence of temporal arteritis [FreeTextEntry1] : Back is negative no masses prostate is normal [de-identified] : No adenopathy [de-identified] : Thyroid is not palpable [de-identified] : Tattoos on the right arm

## 2021-03-19 ENCOUNTER — APPOINTMENT (OUTPATIENT)
Dept: NEUROLOGY | Facility: CLINIC | Age: 36
End: 2021-03-19
Payer: COMMERCIAL

## 2021-03-19 ENCOUNTER — OUTPATIENT (OUTPATIENT)
Dept: OUTPATIENT SERVICES | Facility: HOSPITAL | Age: 36
LOS: 1 days | End: 2021-03-19
Payer: COMMERCIAL

## 2021-03-19 ENCOUNTER — RESULT REVIEW (OUTPATIENT)
Age: 36
End: 2021-03-19

## 2021-03-19 ENCOUNTER — APPOINTMENT (OUTPATIENT)
Dept: MRI IMAGING | Facility: CLINIC | Age: 36
End: 2021-03-19

## 2021-03-19 VITALS
BODY MASS INDEX: 33.12 KG/M2 | SYSTOLIC BLOOD PRESSURE: 125 MMHG | WEIGHT: 272 LBS | DIASTOLIC BLOOD PRESSURE: 74 MMHG | HEIGHT: 76 IN | HEART RATE: 72 BPM

## 2021-03-19 DIAGNOSIS — Z78.9 OTHER SPECIFIED HEALTH STATUS: ICD-10-CM

## 2021-03-19 DIAGNOSIS — Z83.3 FAMILY HISTORY OF DIABETES MELLITUS: ICD-10-CM

## 2021-03-19 DIAGNOSIS — G44.59 OTHER COMPLICATED HEADACHE SYNDROME: ICD-10-CM

## 2021-03-19 LAB
ALBUMIN SERPL ELPH-MCNC: 4.3 G/DL
ALP BLD-CCNC: 64 U/L
ALT SERPL-CCNC: 15 U/L
ANION GAP SERPL CALC-SCNC: 9 MMOL/L
APPEARANCE: CLEAR
AST SERPL-CCNC: 17 U/L
BACTERIA: NEGATIVE
BASOPHILS # BLD AUTO: 0.05 K/UL
BASOPHILS NFR BLD AUTO: 1 %
BILIRUB SERPL-MCNC: 0.6 MG/DL
BILIRUBIN URINE: NEGATIVE
BLOOD URINE: NEGATIVE
BUN SERPL-MCNC: 15 MG/DL
CALCIUM SERPL-MCNC: 9.6 MG/DL
CHLORIDE SERPL-SCNC: 103 MMOL/L
CHOLEST SERPL-MCNC: 201 MG/DL
CO2 SERPL-SCNC: 28 MMOL/L
COLOR: YELLOW
CREAT SERPL-MCNC: 1.01 MG/DL
EOSINOPHIL # BLD AUTO: 0.11 K/UL
EOSINOPHIL NFR BLD AUTO: 2.1 %
ERYTHROCYTE [SEDIMENTATION RATE] IN BLOOD BY WESTERGREN METHOD: 9 MM/HR
GLUCOSE QUALITATIVE U: NEGATIVE
GLUCOSE SERPL-MCNC: 105 MG/DL
HCT VFR BLD CALC: 41.4 %
HDLC SERPL-MCNC: 33 MG/DL
HGB BLD-MCNC: 13.6 G/DL
HYALINE CASTS: 0 /LPF
IMM GRANULOCYTES NFR BLD AUTO: 0.2 %
KETONES URINE: NEGATIVE
LDLC SERPL CALC-MCNC: 144 MG/DL
LEUKOCYTE ESTERASE URINE: NEGATIVE
LYMPHOCYTES # BLD AUTO: 2.02 K/UL
LYMPHOCYTES NFR BLD AUTO: 38.5 %
MAN DIFF?: NORMAL
MCHC RBC-ENTMCNC: 31.1 PG
MCHC RBC-ENTMCNC: 32.9 GM/DL
MCV RBC AUTO: 94.7 FL
MICROSCOPIC-UA: NORMAL
MONOCYTES # BLD AUTO: 0.38 K/UL
MONOCYTES NFR BLD AUTO: 7.3 %
NEUTROPHILS # BLD AUTO: 2.67 K/UL
NEUTROPHILS NFR BLD AUTO: 50.9 %
NITRITE URINE: NEGATIVE
NONHDLC SERPL-MCNC: 168 MG/DL
PH URINE: 6
PLATELET # BLD AUTO: 240 K/UL
POTASSIUM SERPL-SCNC: 4.6 MMOL/L
PROT SERPL-MCNC: 7 G/DL
PROTEIN URINE: NORMAL
RBC # BLD: 4.37 M/UL
RBC # FLD: 11.9 %
RED BLOOD CELLS URINE: 3 /HPF
SODIUM SERPL-SCNC: 141 MMOL/L
SPECIFIC GRAVITY URINE: 1.03
SQUAMOUS EPITHELIAL CELLS: 0 /HPF
T4 SERPL-MCNC: 5.3 UG/DL
TRIGL SERPL-MCNC: 119 MG/DL
TSH SERPL-ACNC: 1.78 UIU/ML
UROBILINOGEN URINE: NORMAL
WBC # FLD AUTO: 5.24 K/UL
WHITE BLOOD CELLS URINE: 1 /HPF

## 2021-03-19 PROCEDURE — 70551 MRI BRAIN STEM W/O DYE: CPT | Mod: 26

## 2021-03-19 PROCEDURE — 99072 ADDL SUPL MATRL&STAF TM PHE: CPT

## 2021-03-19 PROCEDURE — 99205 OFFICE O/P NEW HI 60 MIN: CPT

## 2021-03-19 PROCEDURE — 70544 MR ANGIOGRAPHY HEAD W/O DYE: CPT | Mod: 26,59

## 2021-03-19 RX ORDER — NAPROXEN SODIUM 220 MG
TABLET ORAL
Refills: 0 | Status: ACTIVE | COMMUNITY

## 2021-03-19 NOTE — REVIEW OF SYSTEMS
[Fever] : no fever [Chills] : no chills [Feeling Tired] : not feeling tired [Sleep Disturbances] : sleep disturbances [As Noted in HPI] : as noted in HPI [Dizziness] : no dizziness [Lightheadedness] : no lightheadedness [Tension Headache] : tension-type headaches [Negative] : Heme/Lymph

## 2021-03-19 NOTE — END OF VISIT
[facility]  Gastroenterology  H&P    Patient Name: Topher Cr  MRN: [id number]  Admission Date: 2/15/2019  Code Status: No Order    Attending Provider: Boris Carey MD   Primary Care Physician: Navin Mcfadden MD  Principal Problem:<principal problem not specified>    Subjective:     History of Present Illness: Diarrhea      Past Medical History:   Diagnosis Date    Allergy     Blood clotting tendency     DJD (degenerative joint disease)     Hyperlipidemia     Morbid obesity with BMI of 40.0-44.9, adult 3/29/2018    Obesity     Urticaria        Past Surgical History:   Procedure Laterality Date    COLONOSCOPY N/A 8/6/2015    Performed by Ariel Baez MD at [facility] (4TH FLR)    KNEE SURGERY Right        Review of patient's allergies indicates:   Allergen Reactions    Cucumber fruit extract Hives and Itching     Family History     Problem Relation (Age of Onset)    Asthma Mother, Brother    Heart disease Father        Tobacco Use    Smoking status: Never Smoker    Smokeless tobacco: Never Used   Substance and Sexual Activity    Alcohol use: No     Alcohol/week: 0.0 oz    Drug use: No    Sexual activity: Not on file     Review of Systems   Constitutional: Negative for appetite change and unexpected weight change.   Respiratory: Negative for apnea and chest tightness.    Cardiovascular: Negative for chest pain and palpitations.   Gastrointestinal: Positive for diarrhea. Negative for abdominal distention.     Objective:     Vital Signs (Most Recent):    Vital Signs (24h Range):           There is no height or weight on file to calculate BMI.    No intake or output data in the 24 hours ending 02/15/19 0841    Lines/Drains/Airways          None          Physical Exam   Constitutional: He is oriented to person, place, and time. He appears well-developed and well-nourished. No distress.   HENT:   Head: Normocephalic.   Eyes: Conjunctivae are normal. No scleral icterus.   Neck: No  tracheal deviation present. No thyromegaly present.   Cardiovascular: Normal rate, regular rhythm and normal heart sounds. Exam reveals no gallop and no friction rub.   No murmur heard.  Pulmonary/Chest: Effort normal and breath sounds normal. He has no wheezes. He has no rales.   Abdominal: Soft. Bowel sounds are normal. He exhibits no distension. There is no tenderness. There is no rebound and no guarding.   Musculoskeletal: Normal range of motion. He exhibits no edema or tenderness.   Neurological: He is alert and oriented to person, place, and time.   Skin: He is not diaphoretic.   Psychiatric: He has a normal mood and affect. His behavior is normal.           Assessment/Plan:   - Diarrhea    Plan  1. EGD/Colonoscopy    Boris Carey MD  Gastroenterology  [facility]   [Time Spent: ___ minutes] : I have spent [unfilled] minutes of time on the encounter. [>50% of the face to face encounter time was spent on counseling and/or coordination of care for ___] : Greater than 50% of the face to face encounter time was spent on counseling and/or coordination of care for [unfilled]

## 2021-03-19 NOTE — DISCUSSION/SUMMARY
[FreeTextEntry1] : 1. Patient with new onset headache since early January involving the frontal and temple regions occurring more frequently and intensely \par \par 2. Will obtain MRI and MA of the brain to assess for any structural abnormality \par \par 3. Keep headache diary and may use NSAIDS for abortive and will start for prevention CoQ 10 100mg daily and magnesium oxide 400mg daily \par \par 4. Follow up in 6-8 weeks and all questions answered.

## 2021-03-19 NOTE — PHYSICAL EXAM
[General Appearance - Alert] : alert [Oriented To Time, Place, And Person] : oriented to person, place, and time [Person] : oriented to person [Place] : oriented to place [Time] : oriented to time [Cranial Nerves Optic (II)] : visual acuity intact bilaterally,  visual fields full to confrontation, pupils equal round and reactive to light [Cranial Nerves Oculomotor (III)] : extraocular motion intact [Cranial Nerves Trigeminal (V)] : facial sensation intact symmetrically [Cranial Nerves Facial (VII)] : face symmetrical [Cranial Nerves Vestibulocochlear (VIII)] : hearing was intact bilaterally [Cranial Nerves Glossopharyngeal (IX)] : tongue and palate midline [Cranial Nerves Accessory (XI - Cranial And Spinal)] : head turning and shoulder shrug symmetric [Cranial Nerves Hypoglossal (XII)] : there was no tongue deviation with protrusion [Motor Strength] : muscle strength was normal in all four extremities [Involuntary Movements] : no involuntary movements were seen [No Muscle Atrophy] : normal bulk in all four extremities [Motor Handedness Right-Handed] : the patient is right hand dominant [Paresis Pronator Drift Right-Sided] : no pronator drift on the right [Paresis Pronator Drift Left-Sided] : no pronator drift on the left [Sensation Tactile Decrease] : light touch was intact [Sensation Pain / Temperature Decrease] : pain and temperature was intact [Romberg's Sign] : Romberg's sign was negtive [Dysesthesia] : no dysesthesia [Hyperesthesia] : no hyperesthesia [Limited Balance] : balance was intact [Past-pointing] : there was no past-pointing [Tremor] : no tremor present [Coordination - Dysmetria Impaired Finger-to-Nose Bilateral] : not present [3+] : Patella left 3+ [2+] : Ankle jerk left 2+ [Plantar Reflex Right Only] : normal on the right [Plantar Reflex Left Only] : normal on the left [FreeTextEntry5] : no nystagmus  [Extraocular Movements] : extraocular movements were intact [Neck Appearance] : the appearance of the neck was normal [] : no rash [Skin Lesions] : no skin lesions

## 2021-03-19 NOTE — HISTORY OF PRESENT ILLNESS
[FreeTextEntry1] : Patient reports that since January he has been experiencing more intense headaches. They are located on the frontal region and temple region and are throbbing and pressure like. NO nausea, vomiting, no visual changes or light or sound sensitivity. He denies dizziness with the headaches and no tearing or redness or other autonomic.\par They would every other day or so and can last 1-2 days and this has been happening over the past 2 months. \par He would have to rest and take 1-2 days off and use NSAIDS. 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 \par \par He denies any recent trauma or falls.

## 2021-03-23 ENCOUNTER — NON-APPOINTMENT (OUTPATIENT)
Age: 36
End: 2021-03-23

## 2021-05-18 ENCOUNTER — APPOINTMENT (OUTPATIENT)
Dept: NEUROLOGY | Facility: CLINIC | Age: 36
End: 2021-05-18

## 2021-05-19 ENCOUNTER — APPOINTMENT (OUTPATIENT)
Dept: NEUROLOGY | Facility: CLINIC | Age: 36
End: 2021-05-19
Payer: COMMERCIAL

## 2021-05-19 VITALS
SYSTOLIC BLOOD PRESSURE: 124 MMHG | DIASTOLIC BLOOD PRESSURE: 76 MMHG | HEART RATE: 66 BPM | WEIGHT: 270 LBS | HEIGHT: 76 IN | BODY MASS INDEX: 32.88 KG/M2

## 2021-05-19 DIAGNOSIS — G44.59 OTHER COMPLICATED HEADACHE SYNDROME: ICD-10-CM

## 2021-05-19 PROCEDURE — 99072 ADDL SUPL MATRL&STAF TM PHE: CPT

## 2021-05-19 PROCEDURE — 99214 OFFICE O/P EST MOD 30 MIN: CPT

## 2021-05-19 RX ORDER — ONDANSETRON 4 MG/1
4 TABLET ORAL
Qty: 90 | Refills: 1 | Status: ACTIVE | COMMUNITY
Start: 2021-05-19 | End: 1900-01-01

## 2021-05-19 NOTE — REVIEW OF SYSTEMS
[Fever] : no fever [Chills] : no chills [Sleep Disturbances] : no sleep disturbances [As Noted in HPI] : as noted in HPI [Abnormal Sensation] : no abnormal sensation [Dizziness] : no dizziness [Lightheadedness] : no lightheadedness [Migraine Headache] : no migraine headache [Tension Headache] : no tension-type headache [Negative] : Heme/Lymph

## 2021-05-19 NOTE — DISCUSSION/SUMMARY
[FreeTextEntry1] : 1, Likely migraine headaches : doing well and he will continue magnesium 400mg daily and for abortive aleve and zofran as needed \par \par 2. Keep headache diary . stay well hydrated and avoid potential triggers \par \par 3. Follow up in 3 months

## 2021-05-19 NOTE — HISTORY OF PRESENT ILLNESS
[FreeTextEntry1] : Patient reports that since last visit he has been doing well and denies any headache today. He had only 1 day of headache. He has been taking the magnesium and it has been helping a great deal. He had to stop the coenzyme Q 10 as he felt nauseated with it. \par He will uses the naprosen as needed for abortive and it works well.\par He denies any new neurologic symptoms and is pleased with his progress.

## 2021-05-19 NOTE — PHYSICAL EXAM
[General Appearance - Alert] : alert [Oriented To Time, Place, And Person] : oriented to person, place, and time [Person] : oriented to person [Place] : oriented to place [Time] : oriented to time [Cranial Nerves Optic (II)] : visual acuity intact bilaterally,  visual fields full to confrontation, pupils equal round and reactive to light [Cranial Nerves Oculomotor (III)] : extraocular motion intact [Cranial Nerves Facial (VII)] : face symmetrical [Cranial Nerves Trigeminal (V)] : facial sensation intact symmetrically [Cranial Nerves Vestibulocochlear (VIII)] : hearing was intact bilaterally [Cranial Nerves Glossopharyngeal (IX)] : tongue and palate midline [Cranial Nerves Accessory (XI - Cranial And Spinal)] : head turning and shoulder shrug symmetric [Cranial Nerves Hypoglossal (XII)] : there was no tongue deviation with protrusion [Motor Strength] : muscle strength was normal in all four extremities [Involuntary Movements] : no involuntary movements were seen [No Muscle Atrophy] : normal bulk in all four extremities [Motor Handedness Right-Handed] : the patient is right hand dominant [Paresis Pronator Drift Right-Sided] : no pronator drift on the right [Paresis Pronator Drift Left-Sided] : no pronator drift on the left [Sensation Tactile Decrease] : light touch was intact [Sensation Pain / Temperature Decrease] : pain and temperature was intact [Romberg's Sign] : Romberg's sign was negtive [Dysesthesia] : no dysesthesia [Hyperesthesia] : no hyperesthesia [Limited Balance] : balance was intact [Past-pointing] : there was no past-pointing [Tremor] : no tremor present [Coordination - Dysmetria Impaired Finger-to-Nose Bilateral] : not present [3+] : Patella left 3+ [2+] : Ankle jerk left 2+ [Plantar Reflex Right Only] : normal on the right [Plantar Reflex Left Only] : normal on the left [FreeTextEntry5] : no nystagmus

## 2021-06-23 ENCOUNTER — EMERGENCY (EMERGENCY)
Facility: HOSPITAL | Age: 36
LOS: 1 days | Discharge: HOME | End: 2021-06-23
Admitting: EMERGENCY MEDICINE
Payer: OTHER MISCELLANEOUS

## 2021-06-23 VITALS
DIASTOLIC BLOOD PRESSURE: 78 MMHG | HEIGHT: 77 IN | SYSTOLIC BLOOD PRESSURE: 146 MMHG | RESPIRATION RATE: 16 BRPM | OXYGEN SATURATION: 100 % | TEMPERATURE: 98 F | HEART RATE: 77 BPM

## 2021-06-23 PROCEDURE — 99053 MED SERV 10PM-8AM 24 HR FAC: CPT

## 2021-06-23 PROCEDURE — 73030 X-RAY EXAM OF SHOULDER: CPT | Mod: 26,LT

## 2021-06-23 PROCEDURE — 99284 EMERGENCY DEPT VISIT MOD MDM: CPT

## 2021-06-23 RX ORDER — ACETAMINOPHEN 500 MG
650 TABLET ORAL ONCE
Refills: 0 | Status: COMPLETED | OUTPATIENT
Start: 2021-06-23 | End: 2021-06-23

## 2021-06-23 RX ADMIN — Medication 650 MILLIGRAM(S): at 23:42

## 2021-06-23 NOTE — ED PROVIDER NOTE - NS ED ROS FT
Constitutional: (-) fever  Head: Normal cephalic, Atraumatic  Cardiovascular: (-) chest pain, (-) wheezing  Respiratory: (-) cough, (-) shortness of breath  Gastrointestinal: (-) vomiting, (-) diarrhea, (-) abdominal pain  : (-) dysuria   Musculoskeletal: (-) back pain (+) LT shoulder pain  Integumentary: (-) rash, (-) edema  Neurological: (-)loc  Allergic/Immunologic: (-) pruritus

## 2021-06-23 NOTE — ED ADULT TRIAGE NOTE - CHIEF COMPLAINT QUOTE
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 No complaints of chest pain, headache, nausea, dizziness, vomiting  SOB, fever, chills verbalized. pt unable to lift left arm due to pain. pt has equal strengths and sensation.

## 2021-06-23 NOTE — ED PROVIDER NOTE - CLINICAL SUMMARY MEDICAL DECISION MAKING FREE TEXT BOX
35 Y/O M w/ no PMH presents to ER for LT shoulder pain.  XR r/o acute fracture  Pain management  Ortho clinic follow up

## 2021-06-23 NOTE — ED PROVIDER NOTE - PATIENT PORTAL LINK FT
You can access the FollowMyHealth Patient Portal offered by [facility] by registering at the following website: [email or web address]. By joining [employer]’s FollowMyHealth portal, you will also be able to view your health information using other applications (apps) compatible with our system.

## 2021-06-23 NOTE — ED PROVIDER NOTE - PHYSICAL EXAMINATION
Vital signs reviewed.   CONSTITUTIONAL: Well-appearing; well-nourished; in no apparent distress. Non-toxic appearing.   HEAD: Normocephalic, atraumatic.  EYES: Normal conjunctiva and no sclera injection noted  ENT: normal nose; no rhinorrhea  CARD: Normal S1, S2  RESP: Normal chest excursion with respiration; breath sounds clear and equal bilaterally.  ABD/GI: soft, non-distended; non-tender; no CVA tenderness  EXT/MS: LT lateral shoulder tenderness. DROM. No deformity. Strength 5/5. Radial pulse +2  SKIN: Normal for age and race; warm; dry; good turgor; no apparent lesions or exudate noted.  NEURO: Awake, alert, oriented x 3, no gross deficits, CN II-XII grossly intact. No pronator drift  PSYCH: Normal mood; appropriate affect.

## 2021-06-23 NOTE — ED PROVIDER NOTE - OBJECTIVE STATEMENT
35 Y/O M w/ no PMH presents to ER for LT shoulder pain. 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 Provided guidance to patient regarding lifting procedure but fell backward on shoulder. Pt fell something pull and has since experienced difficulty with movement and at rest. RT hand dominant. Denies fever, nausea/vomiting, dizziness, headache, chest pain, shortness of breath, numbness/tingling.

## 2021-06-23 NOTE — ED PROVIDER NOTE - NSFOLLOWUPINSTRUCTIONS_ED_ALL_ED_FT
1) Please follow up with primary care for further evaluation  2) Return to the ED immediately for new or worsening symptoms including chest pain, shortness of breath, nausea/vomiting or dizziness.  3) Please continue to take any home medications as prescribed. Take Tylenol 325 mg every 4 hours for pain relief/fever control or ibuprofen 600 mg every 6 hours for pain relief/fever control  4) Your test results from your ED visit were discussed with you prior to discharge  5) You were provided with a copy of your test results

## 2021-08-17 ENCOUNTER — APPOINTMENT (OUTPATIENT)
Dept: NEUROLOGY | Facility: CLINIC | Age: 36
End: 2021-08-17

## 2021-09-10 NOTE — ED ADULT NURSE NOTE - MODE OF DISCHARGE
----- Message from [facility] sent at 9/10/2021 10:10 AM EDT -----  Subject: Message to Provider    QUESTIONS  Information for Provider? Pt is coughing alot. Tried over the counter   medicine and did not work. Wondering if he could have medication sent to   his pharmacy.   ---------------------------------------------------------------------------  --------------  [address]  What is the best way for the office to contact you? OK to leave message on   voicemail  Preferred Call Back Phone Number? [phone]  ---------------------------------------------------------------------------  --------------  SCRIPT ANSWERS  Relationship to Patient? Other  Representative Name? [email or web address]  Is the Representative on the appropriate HIPAA document in Epic?  Yes
I scheduled a VV  With Sally Clark today at 12, and patient stated he would like the antibiotic that Dr. Shar Bajwa prescribed in the past for this same issue. Patient can't remember the name, and I looked all the way back to like 2018 and some were from different providers.
May schedule virtual visit
Ambulatory

## 2021-10-08 ENCOUNTER — APPOINTMENT (OUTPATIENT)
Dept: INTERNAL MEDICINE | Facility: CLINIC | Age: 36
End: 2021-10-08
Payer: COMMERCIAL

## 2021-10-18 ENCOUNTER — APPOINTMENT (OUTPATIENT)
Dept: INTERNAL MEDICINE | Facility: CLINIC | Age: 36
End: 2021-10-18
Payer: COMMERCIAL

## 2021-10-18 DIAGNOSIS — Z00.00 ENCOUNTER FOR GENERAL ADULT MEDICAL EXAMINATION W/OUT ABNORMAL FINDINGS: ICD-10-CM

## 2021-10-18 PROCEDURE — 36415 COLL VENOUS BLD VENIPUNCTURE: CPT

## 2021-10-20 LAB
HBV SURFACE AB SER QL: REACTIVE
M TB IFN-G BLD-IMP: NEGATIVE
MEV IGG FLD QL IA: >300 AU/ML
MEV IGG+IGM SER-IMP: POSITIVE
MUV AB SER-ACNC: POSITIVE
MUV IGG SER QL IA: 47.3 AU/ML
QUANTIFERON TB PLUS MITOGEN MINUS NIL: 1.21 IU/ML
QUANTIFERON TB PLUS NIL: 0.02 IU/ML
QUANTIFERON TB PLUS TB1 MINUS NIL: -0.01 IU/ML
QUANTIFERON TB PLUS TB2 MINUS NIL: -0.01 IU/ML
RUBV IGG FLD-ACNC: 5.9 INDEX
RUBV IGG SER-IMP: POSITIVE
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
